# Patient Record
Sex: FEMALE | Race: WHITE | NOT HISPANIC OR LATINO | RURAL
[De-identification: names, ages, dates, MRNs, and addresses within clinical notes are randomized per-mention and may not be internally consistent; named-entity substitution may affect disease eponyms.]

---

## 2018-07-17 ENCOUNTER — CONVERSION ENCOUNTER (OUTPATIENT)
Dept: FAMILY MEDICINE CLINIC | Facility: CLINIC | Age: 38
End: 2018-07-17

## 2018-08-20 ENCOUNTER — CONVERSION ENCOUNTER (OUTPATIENT)
Dept: FAMILY MEDICINE CLINIC | Facility: CLINIC | Age: 38
End: 2018-08-20

## 2018-09-26 ENCOUNTER — CONVERSION ENCOUNTER (OUTPATIENT)
Dept: FAMILY MEDICINE CLINIC | Facility: CLINIC | Age: 38
End: 2018-09-26

## 2018-11-19 ENCOUNTER — CONVERSION ENCOUNTER (OUTPATIENT)
Dept: FAMILY MEDICINE CLINIC | Facility: CLINIC | Age: 38
End: 2018-11-19

## 2019-06-04 VITALS
DIASTOLIC BLOOD PRESSURE: 72 MMHG | OXYGEN SATURATION: 99 % | HEIGHT: 67 IN | HEIGHT: 67 IN | DIASTOLIC BLOOD PRESSURE: 83 MMHG | WEIGHT: 150.38 LBS | HEART RATE: 85 BPM | HEIGHT: 67 IN | BODY MASS INDEX: 23.6 KG/M2 | BODY MASS INDEX: 23.27 KG/M2 | SYSTOLIC BLOOD PRESSURE: 129 MMHG | HEART RATE: 80 BPM | OXYGEN SATURATION: 98 % | OXYGEN SATURATION: 99 % | RESPIRATION RATE: 16 BRPM | HEIGHT: 67 IN | HEART RATE: 98 BPM | DIASTOLIC BLOOD PRESSURE: 83 MMHG | SYSTOLIC BLOOD PRESSURE: 123 MMHG | RESPIRATION RATE: 18 BRPM | WEIGHT: 157.6 LBS | OXYGEN SATURATION: 99 % | WEIGHT: 143.6 LBS | RESPIRATION RATE: 16 BRPM | SYSTOLIC BLOOD PRESSURE: 107 MMHG | RESPIRATION RATE: 16 BRPM | HEART RATE: 90 BPM | BODY MASS INDEX: 24.74 KG/M2 | BODY MASS INDEX: 22.54 KG/M2 | WEIGHT: 148.25 LBS

## 2020-03-16 ENCOUNTER — OFFICE VISIT (OUTPATIENT)
Dept: FAMILY MEDICINE CLINIC | Facility: CLINIC | Age: 40
End: 2020-03-16

## 2020-03-16 VITALS
WEIGHT: 171 LBS | SYSTOLIC BLOOD PRESSURE: 122 MMHG | RESPIRATION RATE: 16 BRPM | HEART RATE: 100 BPM | HEIGHT: 68 IN | OXYGEN SATURATION: 97 % | TEMPERATURE: 98.2 F | DIASTOLIC BLOOD PRESSURE: 77 MMHG | BODY MASS INDEX: 25.91 KG/M2

## 2020-03-16 DIAGNOSIS — F33.2 SEVERE EPISODE OF RECURRENT MAJOR DEPRESSIVE DISORDER, WITHOUT PSYCHOTIC FEATURES (HCC): ICD-10-CM

## 2020-03-16 DIAGNOSIS — F41.9 ANXIETY: Primary | ICD-10-CM

## 2020-03-16 PROBLEM — F32.A DEPRESSION: Status: ACTIVE | Noted: 2020-03-16

## 2020-03-16 PROBLEM — F41.0 PANIC DISORDER: Status: ACTIVE | Noted: 2018-07-17

## 2020-03-16 PROCEDURE — 99213 OFFICE O/P EST LOW 20 MIN: CPT | Performed by: FAMILY MEDICINE

## 2020-03-16 RX ORDER — HYDROCODONE BITARTRATE AND ACETAMINOPHEN 5; 325 MG/1; MG/1
1 TABLET ORAL EVERY 6 HOURS PRN
COMMUNITY
Start: 2020-03-09

## 2020-03-16 RX ORDER — BUSPIRONE HYDROCHLORIDE 5 MG/1
5 TABLET ORAL 3 TIMES DAILY
Qty: 90 TABLET | Refills: 11 | Status: SHIPPED | OUTPATIENT
Start: 2020-03-16

## 2020-03-16 RX ORDER — NORETHINDRONE 0.35 MG/1
TABLET ORAL
COMMUNITY
Start: 2020-03-06

## 2020-03-16 RX ORDER — IBUPROFEN 800 MG/1
TABLET ORAL
COMMUNITY
Start: 2020-03-09

## 2020-03-16 RX ORDER — CONDOMS, FEMALE
EACH MISCELLANEOUS
COMMUNITY
Start: 2020-01-02

## 2020-03-16 RX ORDER — ESCITALOPRAM OXALATE 20 MG/1
20 TABLET ORAL DAILY
Qty: 90 TABLET | Refills: 3 | Status: SHIPPED | OUTPATIENT
Start: 2020-03-16

## 2020-03-16 RX ORDER — ESCITALOPRAM OXALATE 10 MG/1
10 TABLET ORAL DAILY
COMMUNITY
Start: 2020-03-05 | End: 2020-03-16

## 2020-03-16 RX ORDER — SERTRALINE HYDROCHLORIDE 100 MG/1
TABLET, FILM COATED ORAL
COMMUNITY
Start: 2020-02-03 | End: 2020-03-16

## 2020-03-16 NOTE — PROGRESS NOTES
Patient with longstanding depression and anxiety presents for follow-up.  She has moved to the [facility] and had been seen by a different primary care provider and medications have been adjusted.  Patient has had recurrence of symptoms.  Her anxiety has been fairly well controlled but the depression is been worse.  She does not have any suicidal ideations.  She has good support.  She does have some element of PTSD.  She is having difficulty with work and had her medications changed from sertraline back to escitalopram.    Past Medical History:   Diagnosis Date   • Abnormal Pap smear of cervix    • Allergic rhinitis    • Anxiety    • Asthma, exercise induced    • Depression    • Diarrhea    • HPV in female    • Panic disorder    • Pregnant state, incidental      Past Surgical History:   Procedure Laterality Date   • COLONOSCOPY  2012,2013   • DENTAL PROCEDURE      IMPLANT   • LEEP  03/2015    [facility],DR HORNER   • LIPOMA EXCISION Right     THIGH     Family History   Problem Relation Age of Onset   • Diabetes Maternal Grandfather    • Stroke Paternal Grandmother      Social History     Tobacco Use   • Smoking status: Former Smoker   • Smokeless tobacco: Never Used   Substance Use Topics   • Alcohol use: Yes     Current Outpatient Medications on File Prior to Visit   Medication Sig Dispense Refill   • Condoms - Female (FC2 FEMALE CONDOM) misc      • LORNA 0.35 MG tablet      • HYDROcodone-acetaminophen (NORCO) 5-325 MG per tablet Take 1 tablet by mouth Every 6 (Six) Hours As Needed. for pain     • ibuprofen (ADVIL,MOTRIN) 800 MG tablet TAKE 1 TABLET BY MOUTH THREE TIMES A DAY FOR 8 DAYS WITH FOOD       No current facility-administered medications on file prior to visit.         Review of Systems   Constitutional: Negative for chills, fatigue and fever.   Respiratory: Negative for cough and shortness of breath.    Cardiovascular: Negative for chest pain and palpitations.   Musculoskeletal: Negative for back  "pain and myalgias.   Neurological: Negative for dizziness and headache.   Psychiatric/Behavioral: Positive for depressed mood. The patient is nervous/anxious.      Vitals:    03/16/20 1552   BP: 122/77   BP Location: Left arm   Patient Position: Sitting   Cuff Size: Adult   Pulse: 100   Resp: 16   Temp: 98.2 °F (36.8 °C)   TempSrc: Oral   SpO2: 97%   Weight: 77.6 kg (171 lb)   Height: 172.7 cm (68\")     Body mass index is 26 kg/m².  Physical Exam   Constitutional: She is oriented to person, place, and time. She appears well-developed and well-nourished. No distress.   Cardiovascular: Normal rate, regular rhythm and normal heart sounds.   No murmur heard.  Pulmonary/Chest: Effort normal and breath sounds normal. She has no wheezes. She has no rales.   Abdominal: Soft. Bowel sounds are normal. She exhibits no distension.   Musculoskeletal: Normal range of motion.   Neurological: She is alert and oriented to person, place, and time.   Skin: Skin is warm and dry.   Psychiatric: She has a normal mood and affect. Her behavior is normal.           Diagnoses and all orders for this visit:    1. Anxiety (Primary)  Assessment & Plan:  We will add buspirone.  If she is not responding will consider increasing to 10 mg 3 times daily as maximum dose.  May need psychiatry intervention.      2. Severe episode of recurrent major depressive disorder, without psychotic features (CMS/HCC)  Assessment & Plan:  Will increase SSRI to maximum dose.  Consider behavioral health referral if no better.      Other orders  -     escitalopram (LEXAPRO) 20 MG tablet; Take 1 tablet by mouth Daily.  Dispense: 90 tablet; Refill: 3  -     busPIRone (BUSPAR) 5 MG tablet; Take 1 tablet by mouth 3 (Three) Times a Day.  Dispense: 90 tablet; Refill: 11    "

## 2020-03-17 NOTE — ASSESSMENT & PLAN NOTE
We will add buspirone.  If she is not responding will consider increasing to 10 mg 3 times daily as maximum dose.  May need psychiatry intervention.

## 2020-05-19 ENCOUNTER — TELEPHONE (OUTPATIENT)
Dept: FAMILY MEDICINE CLINIC | Facility: CLINIC | Age: 40
End: 2020-05-19

## 2020-05-19 NOTE — TELEPHONE ENCOUNTER
Dr. Patrick Trivedi (psychiatrist) with her [employer] called to do a peer to peer review with you on Jill. The case is due to close out on Thursday and he was hoping to discuss the case with you prior to then. He can be reached at [phone].

## 2020-07-27 ENCOUNTER — TELEPHONE (OUTPATIENT)
Dept: FAMILY MEDICINE CLINIC | Facility: CLINIC | Age: 40
End: 2020-07-27

## 2020-07-27 NOTE — TELEPHONE ENCOUNTER
I called and discussed patient with her physician.  Since we have not seen her since April, they did not need much information from us.

## 2020-07-27 NOTE — TELEPHONE ENCOUNTER
Dr. Erickson would like for you to call him in regards to her application for psychiatric disability. [phone].

## 2021-03-01 ENCOUNTER — TELEMEDICINE (OUTPATIENT)
Dept: FAMILY MEDICINE CLINIC | Facility: CLINIC | Age: 41
End: 2021-03-01

## 2021-03-01 DIAGNOSIS — M54.31 SCIATICA OF RIGHT SIDE: Primary | ICD-10-CM

## 2021-03-01 PROCEDURE — 99213 OFFICE O/P EST LOW 20 MIN: CPT | Performed by: FAMILY MEDICINE

## 2021-03-01 NOTE — PROGRESS NOTES
Chief Complaint  Back Pain    Subjective         Blanca Castrejon presents to [facility] for       Patient was seen today through synchronous audio/video technology during the COVID-19 Public Health Emergency. Verbal consent was obtained. The patient was located at home. Vitals signs were not obtained due to lack of home monitoring access. Time spent with patient was 12 minutes.    Patient is seen today because of sciatica.  She was recently seen at [facility] emergency room for sciatica that has been going on for 2 weeks.  She is having numbness in her right foot.  Her back pain is better after treatment with muscle relaxers, analgesics, and steroids.  She does not have bowel or bladder dysfunction.  However, the numbness and foot drop have been bothering her.  The emergency room physician recommended she see someone at the spine center.  She is back home now in [facility] and needs a referral to a local neurosurgeon.     Objective   Vital Signs:   There were no vitals taken for this visit.    Physical Exam  Constitutional:       General: She is not in acute distress.     Appearance: She is well-developed.   HENT:      Right Ear: Hearing normal.      Left Ear: Hearing normal.   Eyes:      General: Lids are normal. No scleral icterus.        Right eye: No discharge.         Left eye: No discharge.      Conjunctiva/sclera: Conjunctivae normal.      Pupils: Pupils are equal, round, and reactive to light.   Pulmonary:      Effort: Pulmonary effort is normal. No respiratory distress.   Skin:     Coloration: Skin is not pale.      Findings: No rash.   Neurological:      Mental Status: She is alert and oriented to person, place, and time.   Psychiatric:         Speech: Speech normal.         Behavior: Behavior normal.         Thought Content: Thought content normal.         Judgment: Judgment normal.        Result Review :                   Diagnoses and all orders for this visit:    1. Sciatica  of right side (Primary)  Assessment & Plan:  Patient was seen in the emergency room with recommendation for neurosurgery consultation.  I agree that given her numbness we should go ahead and get a neurosurgery opinion.  However, because of her residence in [facility], will need to find someone local.  There is a neurosurgery group through [facility] with a local neurosurgery office.  We will try to get her in at that office.  Warning signs were discussed.          Follow Up   No follow-ups on file.  Patient was given instructions and counseling regarding her condition or for health maintenance advice. Please see specific information pulled into the AVS if appropriate.     Reggie Duane Lyell, MD  3/1/2021  This note was electronically signed.

## 2021-03-01 NOTE — ASSESSMENT & PLAN NOTE
Patient was seen in the emergency room with recommendation for neurosurgery consultation.  I agree that given her numbness we should go ahead and get a neurosurgery opinion.  However, because of her residence in [facility], will need to find someone local.  There is a neurosurgery group through [facility] with a local neurosurgery office.  We will try to get her in at that office.  Warning signs were discussed.